# Patient Record
Sex: FEMALE | Race: WHITE
[De-identification: names, ages, dates, MRNs, and addresses within clinical notes are randomized per-mention and may not be internally consistent; named-entity substitution may affect disease eponyms.]

---

## 2020-07-28 ENCOUNTER — HOSPITAL ENCOUNTER (OUTPATIENT)
Dept: HOSPITAL 43 - DL.ENDO | Age: 40
Discharge: HOME | End: 2020-07-28
Attending: INTERNAL MEDICINE
Payer: COMMERCIAL

## 2020-07-28 DIAGNOSIS — K57.30: Primary | ICD-10-CM

## 2020-07-28 DIAGNOSIS — Z88.7: ICD-10-CM

## 2020-07-28 DIAGNOSIS — E66.09: ICD-10-CM

## 2020-07-28 DIAGNOSIS — K59.00: ICD-10-CM

## 2020-07-28 DIAGNOSIS — Z98.890: ICD-10-CM

## 2020-07-28 DIAGNOSIS — Z87.19: ICD-10-CM

## 2020-07-28 NOTE — LETTER
2020

 

 

 

 

Perla Hayward MD

[address]  [address]

 

RE:  CECELIA TRUONGAN

:  1980

 

Dear Dr. Hayward:

 

Ms. Cecelia Truong had colonoscopic examination done this morning and she tolerated

the procedure well.  I herewith send a copy of the endoscopy note and

photographs for your review.

 

Thank you.

 

Sincerely,

 

DD:  2020 07:57:15

DT:  2020 09:07:34

[facility]

Job #:  [id number]

## 2020-07-28 NOTE — OR
DATE:  07/28/2020

 

PROCEDURE:  Total colonoscopy.

 

INSTRUMENT USED:  PCF-H190DL Olympus video colonoscope.

 

PREMEDICATIONS:  Fentanyl 150 mcg intravenous, Versed 4 mg intravenous, nasal O2

cannula.

 

The procedure was done under pulse oximetry, BP recording, and cardiac monitor.

 

INDICATION:  The patient with progressive constipation and abdominal pain

unexplained and not responsive to medical measures.  Colonoscopic examination is

done for detection of any polypoid lesions and removal, endoscopic hemostasis

therapy if needed.

 

DESCRIPTION OF PROCEDURE:  Initial rectal exam was unremarkable.  Rigid anoscopy

was normal.  The colonoscope was passed with ease.  Scattered diverticula were

noted in the distal left colon along with some deformities.  The scope was

passed with ease up to the ileocecal area.  Photographs were taken of the normal-

appearing cecum, identified by appendiceal orifice and double-bulged ileocecal

folds.  No bleeding was noted from any of the visualized areas.  At the

commencement of the examination, the bowel preparation was found to be adequate.

Tracy scale 2 in all the lesions, total score 6.  No stricture.  No vascular

ectasia.  No large isolated ulcerations seen.  No evidence of diffuse

inflammatory bowel disease in the form of friability, contact bleeding, or

ulcerations.  No polyp or tumor mass identified.  Probing the proximal sides of

folds and flexures using adequate distention and clearing up the stool material,

withdrawal of the scope was made, cecum to rectum time over 6 minutes.  No

bleeding was noted from any of the visualized areas at the completion of

examination.

 

IMPRESSION:  Diverticulosis.

 

The patient tolerated the procedure well.

 

DD:  07/28/2020 07:57:15

DT:  07/28/2020 09:04:20

[facility]

Job #:  [id number]